# Patient Record
Sex: FEMALE | Race: ASIAN | NOT HISPANIC OR LATINO | URBAN - METROPOLITAN AREA
[De-identification: names, ages, dates, MRNs, and addresses within clinical notes are randomized per-mention and may not be internally consistent; named-entity substitution may affect disease eponyms.]

---

## 2018-06-11 ENCOUNTER — EMERGENCY (EMERGENCY)
Facility: HOSPITAL | Age: 83
LOS: 0 days | Discharge: HOME | End: 2018-06-12
Attending: EMERGENCY MEDICINE | Admitting: EMERGENCY MEDICINE

## 2018-06-11 VITALS
RESPIRATION RATE: 20 BRPM | SYSTOLIC BLOOD PRESSURE: 130 MMHG | HEART RATE: 87 BPM | TEMPERATURE: 98 F | DIASTOLIC BLOOD PRESSURE: 66 MMHG | OXYGEN SATURATION: 94 %

## 2018-06-11 DIAGNOSIS — R05 COUGH: ICD-10-CM

## 2018-06-11 DIAGNOSIS — K62.5 HEMORRHAGE OF ANUS AND RECTUM: ICD-10-CM

## 2018-06-11 DIAGNOSIS — R10.12 LEFT UPPER QUADRANT PAIN: ICD-10-CM

## 2018-06-11 DIAGNOSIS — E78.5 HYPERLIPIDEMIA, UNSPECIFIED: ICD-10-CM

## 2018-06-11 DIAGNOSIS — K62.3 RECTAL PROLAPSE: ICD-10-CM

## 2018-06-11 DIAGNOSIS — N83.8 OTHER NONINFLAMMATORY DISORDERS OF OVARY, FALLOPIAN TUBE AND BROAD LIGAMENT: ICD-10-CM

## 2018-06-11 DIAGNOSIS — I10 ESSENTIAL (PRIMARY) HYPERTENSION: ICD-10-CM

## 2018-06-12 VITALS
TEMPERATURE: 91 F | RESPIRATION RATE: 18 BRPM | DIASTOLIC BLOOD PRESSURE: 66 MMHG | SYSTOLIC BLOOD PRESSURE: 137 MMHG | HEART RATE: 78 BPM | OXYGEN SATURATION: 95 %

## 2018-06-12 LAB
ALBUMIN SERPL ELPH-MCNC: 3.6 G/DL — SIGNIFICANT CHANGE UP (ref 3.5–5.2)
ALP SERPL-CCNC: 71 U/L — SIGNIFICANT CHANGE UP (ref 30–115)
ALT FLD-CCNC: 19 U/L — SIGNIFICANT CHANGE UP (ref 0–41)
ANION GAP SERPL CALC-SCNC: 12 MMOL/L — SIGNIFICANT CHANGE UP (ref 7–14)
AST SERPL-CCNC: 29 U/L — SIGNIFICANT CHANGE UP (ref 0–41)
BILIRUB SERPL-MCNC: 0.3 MG/DL — SIGNIFICANT CHANGE UP (ref 0.2–1.2)
BLD GP AB SCN SERPL QL: SIGNIFICANT CHANGE UP
BUN SERPL-MCNC: 14 MG/DL — SIGNIFICANT CHANGE UP (ref 10–20)
CALCIUM SERPL-MCNC: 8 MG/DL — LOW (ref 8.5–10.1)
CHLORIDE SERPL-SCNC: 100 MMOL/L — SIGNIFICANT CHANGE UP (ref 98–110)
CO2 SERPL-SCNC: 25 MMOL/L — SIGNIFICANT CHANGE UP (ref 17–32)
CREAT SERPL-MCNC: 0.7 MG/DL — SIGNIFICANT CHANGE UP (ref 0.7–1.5)
GLUCOSE SERPL-MCNC: 119 MG/DL — HIGH (ref 70–99)
HCT VFR BLD CALC: 34.4 % — LOW (ref 37–47)
HGB BLD-MCNC: 11.1 G/DL — LOW (ref 12–16)
MCHC RBC-ENTMCNC: 27 PG — SIGNIFICANT CHANGE UP (ref 27–31)
MCHC RBC-ENTMCNC: 32.3 G/DL — SIGNIFICANT CHANGE UP (ref 32–37)
MCV RBC AUTO: 83.7 FL — SIGNIFICANT CHANGE UP (ref 81–99)
NRBC # BLD: 0 /100 WBCS — SIGNIFICANT CHANGE UP (ref 0–0)
PLATELET # BLD AUTO: 160 K/UL — SIGNIFICANT CHANGE UP (ref 130–400)
POTASSIUM SERPL-MCNC: 3.5 MMOL/L — SIGNIFICANT CHANGE UP (ref 3.5–5)
POTASSIUM SERPL-SCNC: 3.5 MMOL/L — SIGNIFICANT CHANGE UP (ref 3.5–5)
PROT SERPL-MCNC: 6.2 G/DL — SIGNIFICANT CHANGE UP (ref 6–8)
RBC # BLD: 4.11 M/UL — LOW (ref 4.2–5.4)
RBC # FLD: 15.9 % — HIGH (ref 11.5–14.5)
SODIUM SERPL-SCNC: 137 MMOL/L — SIGNIFICANT CHANGE UP (ref 135–146)
TYPE + AB SCN PNL BLD: SIGNIFICANT CHANGE UP
WBC # BLD: 8.75 K/UL — SIGNIFICANT CHANGE UP (ref 4.8–10.8)
WBC # FLD AUTO: 8.75 K/UL — SIGNIFICANT CHANGE UP (ref 4.8–10.8)

## 2018-06-12 RX ORDER — SODIUM CHLORIDE 9 MG/ML
3 INJECTION INTRAMUSCULAR; INTRAVENOUS; SUBCUTANEOUS EVERY 8 HOURS
Qty: 0 | Refills: 0 | Status: DISCONTINUED | OUTPATIENT
Start: 2018-06-12 | End: 2018-06-12

## 2018-06-12 RX ORDER — SODIUM CHLORIDE 9 MG/ML
1000 INJECTION INTRAMUSCULAR; INTRAVENOUS; SUBCUTANEOUS ONCE
Qty: 0 | Refills: 0 | Status: COMPLETED | OUTPATIENT
Start: 2018-06-12 | End: 2018-06-12

## 2018-06-12 RX ORDER — DOCUSATE SODIUM 100 MG
1 CAPSULE ORAL
Qty: 14 | Refills: 0 | OUTPATIENT
Start: 2018-06-12 | End: 2018-06-18

## 2018-06-12 RX ADMIN — SODIUM CHLORIDE 1000 MILLILITER(S): 9 INJECTION INTRAMUSCULAR; INTRAVENOUS; SUBCUTANEOUS at 02:40

## 2018-06-12 NOTE — ED PROVIDER NOTE - PROGRESS NOTE DETAILS
endorsed to dr be pending US and dispo pt with rectal prolapse which was corrected, labs wnl, CT and US show pelvic cyst which may be etiology of patients pain, results were d/w pt's family who speak english, advised to f/u GI and PMD for further evaluation and management. The patient's family was advised to return to the emergency department in 2-3 days if not improving or sooner if any new symptoms developed, symptoms worsened or for any concerns. They were offered the opportunity to ask questions and verbalized that they understand the diagnosis and discharge instructions.

## 2018-06-12 NOTE — ED PROVIDER NOTE - PHYSICAL EXAMINATION
VITAL SIGNS: I have reviewed the initial vital signs.   CONSTITUTIONAL: Awake, alert. Well-developed; well-nourished; in no distress. Non-toxic appearing.   SKIN: No rash, vesicles/lesion, abrasions or lacerations. No ecchymosis or signs of trauma.   HEAD: Normocephalic; atraumatic.   EYES: Symmetrical, no discharge or signs of trauma. Conjunctiva and sclera clear. PERRL, EOM intact with no nystagmus or pain.   ENT: Airway patent. MMM. Oropharynx clear with no erythema, exudates or tonsillar enlargement. Uvula midline.   NECK: Supple; non-tender.   CARD: No chest wall deformity or tenderness. S1, S2 normal; no murmurs, gallops, or rubs. Regular rate and rhythm.  RESP: Good air movement. Lungs CTAB. No crackles, wheezes, rales or rhonchi.  ABD: Soft; non-distended; + LUQ tenderness w/ grimacing. Guaiac positive, brown/bloody stool. + small rectal prolapse; pushed back in with no complications.   EXT: No bony deformity or tenderness. Normal ROM x 4 extremities.   NEURO: A&Ox3. Strength 5/5 UE/LE b/l. No sensory deficits. n/v intact UE/LE b/l, pulses symmetrical.   PSYCH: Cooperative, appropriate.

## 2018-06-12 NOTE — ED PROVIDER NOTE - CARE PLAN
Principal Discharge DX:	Rectal prolapse  Secondary Diagnosis:	Abdominal pain, unspecified abdominal location Principal Discharge DX:	Rectal prolapse  Secondary Diagnosis:	Abdominal pain, unspecified abdominal location  Secondary Diagnosis:	Adnexal cyst

## 2018-06-12 NOTE — ED PROVIDER NOTE - ATTENDING CONTRIBUTION TO CARE
86 yo female(mandarin speaking) with HLD, HTN presents to ER for rectal bleeding while having a BM tonight. No SOB/CP/dizziness/back pain/dysuria.  Also family noticed something protruding out of rectum (prolapse on our exam and reduced by me). +left sided abdomen pain with no rebound or guarding. +guaiac secondary to prolapsed rectum likely. Agree with PA management. Check labs, ekg, xray, ct scan and reassess
04-Jun-2017 13:41:47

## 2018-06-12 NOTE — ED PROVIDER NOTE - OBJECTIVE STATEMENT
Pt is an 86 y/o Female, PMHX of htn, HLD, presents to ED w/ rectal bleeding. As per family who is at bedside translating for pt, today had episode of bleeding while using the toilet and felt a bulge in the rectal area that went back in. Also reports coughing productive of yellow mucus. Also complaining of left sided abdominal pain. Denies fever, chills, urinary symptoms, diarrhea.

## 2018-06-12 NOTE — ED PROVIDER NOTE - NS ED ROS FT
Except as documented in HPI, all other ROS negative.   GENERAL: Denies fever/chills, loss of appetite/weight or fatigue.  SKIN: Denies rashes, abrasions, lacerations, ecchymosis, erythema, or edema.  HEAD: Denies headache, dizziness or trauma.  EYES: Denies blurry vision, diplopia, or photophobia.  ENT: Denies earaches, discharge or hearing loss. Denies nasal discharge or epistaxis. Denies sore throat.   CARDIAC: Denies chest pain, palpitations, or SOB.   RESPIRATORY: + cough.   GI: + rectal exam.   : Denies hematuria, dysuria or frequency.   MSK: Denies myalgias, bony deformity or pain.   NEURO: Denies paresthesias, tingling, weakness, slurred speech or AMS.
9